# Patient Record
Sex: MALE | Race: WHITE | Employment: FULL TIME | URBAN - METROPOLITAN AREA
[De-identification: names, ages, dates, MRNs, and addresses within clinical notes are randomized per-mention and may not be internally consistent; named-entity substitution may affect disease eponyms.]

---

## 2024-09-18 ENCOUNTER — OFFICE VISIT (OUTPATIENT)
Dept: URGENT CARE | Age: 24
End: 2024-09-18
Payer: COMMERCIAL

## 2024-09-18 VITALS
SYSTOLIC BLOOD PRESSURE: 114 MMHG | DIASTOLIC BLOOD PRESSURE: 74 MMHG | HEIGHT: 69 IN | TEMPERATURE: 98.9 F | WEIGHT: 183.2 LBS | BODY MASS INDEX: 27.13 KG/M2 | RESPIRATION RATE: 16 BRPM | OXYGEN SATURATION: 97 % | HEART RATE: 81 BPM

## 2024-09-18 DIAGNOSIS — J01.10 ACUTE NON-RECURRENT FRONTAL SINUSITIS: Primary | ICD-10-CM

## 2024-09-18 RX ORDER — DOXYCYCLINE 100 MG/1
100 CAPSULE ORAL 2 TIMES DAILY
Qty: 14 CAPSULE | Refills: 0 | Status: SHIPPED | OUTPATIENT
Start: 2024-09-18 | End: 2024-09-19 | Stop reason: SINTOL

## 2024-09-18 RX ORDER — CITALOPRAM 20 MG/1
20 TABLET, FILM COATED ORAL DAILY
COMMUNITY

## 2024-09-18 RX ORDER — FLUTICASONE PROPIONATE 50 MCG
2 SPRAY, SUSPENSION (ML) NASAL DAILY
Qty: 16 G | Refills: 0 | Status: SHIPPED | OUTPATIENT
Start: 2024-09-18 | End: 2024-09-28

## 2024-09-18 ASSESSMENT — ENCOUNTER SYMPTOMS
EYES NEGATIVE: 1
GASTROINTESTINAL NEGATIVE: 1
HEADACHES: 1
SINUS PRESSURE: 1
CONSTITUTIONAL NEGATIVE: 1
ENDOCRINE NEGATIVE: 1
SINUS COMPLAINT: 1
PSYCHIATRIC NEGATIVE: 1
PALPITATIONS: 0
MUSCULOSKELETAL NEGATIVE: 1
RESPIRATORY NEGATIVE: 1
ALLERGIC/IMMUNOLOGIC NEGATIVE: 1
SINUS PAIN: 1
HEMATOLOGIC/LYMPHATIC NEGATIVE: 1

## 2024-09-18 NOTE — LETTER
September 18, 2024     Patient: Bj Kaminski   YOB: 2000   Date of Visit: 9/18/2024       To Whom It May Concern:    It is my medical opinion that Bj Kaminski may return to work on 9/20/24 .    If you have any questions or concerns, please don't hesitate to call.         Sincerely,        GRACIELA Love-CNP    CC: No Recipients

## 2024-09-18 NOTE — PROGRESS NOTES
"Subjective   Patient ID: Bj Kaminski is a 24 y.o. male. They present today with a chief complaint of Sinus Problem (Started 2 days ago /Covid test at home yesterday was negative ), Nasal Congestion, and Facial Pain.    History of Present Illness    Sinus Problem  Associated symptoms: congestion and headaches    Associated symptoms: no chest pain    Facial Pain  Associated symptoms: congestion and headaches    Associated symptoms: no chest pain      Pt presents to urgent care with c/o  sinus pressure, headache, nasal congestion x 3 days.  States he took a home covid test last night which was negative. He is concerned he may have a sinus infection.  Pt denies CP, SOB, palpitations, fevers, abd pain, n/v/d, sick contacts, recent travel.        Past Medical History  Allergies as of 09/18/2024 - Reviewed 09/18/2024   Allergen Reaction Noted    Penicillins Unknown 09/18/2024       (Not in a hospital admission)       No past medical history on file.    No past surgical history on file.         Review of Systems  Review of Systems   Constitutional: Negative.    HENT:  Positive for congestion, sinus pressure and sinus pain.    Eyes: Negative.    Respiratory: Negative.     Cardiovascular:  Negative for chest pain and palpitations.   Gastrointestinal: Negative.    Endocrine: Negative.    Genitourinary: Negative.    Musculoskeletal: Negative.    Skin: Negative.    Allergic/Immunologic: Negative.    Neurological:  Positive for headaches.   Hematological: Negative.    Psychiatric/Behavioral: Negative.     All other systems reviewed and are negative.                                 Objective    Vitals:    09/18/24 1300   BP: 114/74   BP Location: Right arm   Patient Position: Sitting   BP Cuff Size: Adult   Pulse: 81   Resp: 16   Temp: 37.2 °C (98.9 °F)   SpO2: 97%   Weight: 83.1 kg (183 lb 3.2 oz)   Height: 1.74 m (5' 8.5\")     No LMP for male patient.    Physical Exam  Vitals and nursing note reviewed.   Constitutional:       " General: He is not in acute distress.     Appearance: Normal appearance. He is not ill-appearing or toxic-appearing.   HENT:      Head: Atraumatic.      Right Ear: Tympanic membrane, ear canal and external ear normal.      Left Ear: Tympanic membrane, ear canal and external ear normal.      Nose: Nose normal.      Mouth/Throat:      Mouth: Mucous membranes are moist.      Pharynx: Oropharynx is clear. No oropharyngeal exudate or posterior oropharyngeal erythema.   Eyes:      Extraocular Movements: Extraocular movements intact.      Conjunctiva/sclera: Conjunctivae normal.      Pupils: Pupils are equal, round, and reactive to light.   Cardiovascular:      Rate and Rhythm: Normal rate and regular rhythm.      Pulses: Normal pulses.      Heart sounds: Normal heart sounds.   Pulmonary:      Effort: Pulmonary effort is normal. No respiratory distress.      Breath sounds: Normal breath sounds. No stridor. No wheezing, rhonchi or rales.   Chest:      Chest wall: No tenderness.   Abdominal:      General: Abdomen is flat. Bowel sounds are normal.      Palpations: Abdomen is soft.      Tenderness: There is no abdominal tenderness.   Musculoskeletal:         General: Normal range of motion.      Cervical back: Normal range of motion and neck supple. No tenderness.   Skin:     General: Skin is warm and dry.      Capillary Refill: Capillary refill takes less than 2 seconds.   Neurological:      General: No focal deficit present.      Mental Status: He is alert and oriented to person, place, and time.   Psychiatric:         Mood and Affect: Mood normal.         Behavior: Behavior normal.         Thought Content: Thought content normal.         Procedures    Point of Care Test & Imaging Results from this visit  No results found for this visit on 09/18/24.   No results found.    Diagnostic study results (if any) were reviewed by IRWIN Love.    Assessment/Plan   Allergies, medications, history, and pertinent  labs/EKGs/Imaging reviewed by GRACIELA Love-CNP.     Medical Decision Making  Urgent Care Course: Pt presents to the  with rhinorrhea, cough, sinus congestion.  Patient is afebrile, hemodynamically stable.  Patient denies fever, n/v, cp, sob, ab pain, dysuria, and diarrhea.  Patient states that they normally get sinusitis this time of year and requires antibiotics.  Low suspicion for pneumonia/bronchitis given that patient's lungs are clear to auscultation bilaterally.  Given longevity of symptoms, will treat patient for sinusitis with doxy (given PCN allergy), flonase.  Patient given sinusitis and pneumonia warning signs, prescription for doxy, flonase and will f/u with pcp.    Prior external records reviewed: Outpatient records  Independent Hx provided by: Patient  Med Rx considered but ultimately not given: Steroids   Dx tests considered but ultimately not ordered: Covid, Influenza  Social determinant that may affects healthcare: Pharmacy closed  Pt's case/impression summarized and discussed with: Patient  Likely Dx given clinical picture: Sinusitis   Although not an exhaustive list of Differential Diagnosis (though considered), patient's HPI, PE, and other findings are not suggestive of: URI, bronchitis, pneumonia, Covid-19, influenza, asthma exacerbation   Patient at time of discharge was clinically well-appearing and HDS for outpatient management. The patient and/or family was given the opportunity to ask questions prior to discharge, understood my verbal discussion of the plans for treatment, expected course, indications to return to ED, and the need for timely follow up as directed.    Condition: Stable     Orders and Diagnoses  Diagnoses and all orders for this visit:  Acute non-recurrent frontal sinusitis  -     doxycycline (Monodox) 100 mg capsule; Take 1 capsule (100 mg) by mouth 2 times a day for 7 days. Take with at least 8 ounces (large glass) of water, do not lie down for 30 minutes  after  -     fluticasone (Flonase) 50 mcg/actuation nasal spray; Administer 2 sprays into each nostril once daily for 10 days. Shake gently. Before first use, prime pump. After use, clean tip and replace cap.      Medical Admin Record      Patient disposition: Home    Electronically signed by IRWIN Love  2:15 PM

## 2024-09-19 DIAGNOSIS — J01.10 ACUTE NON-RECURRENT FRONTAL SINUSITIS: Primary | ICD-10-CM

## 2024-09-19 RX ORDER — LEVOFLOXACIN 750 MG/1
750 TABLET ORAL DAILY
Qty: 7 TABLET | Refills: 0 | Status: SHIPPED | OUTPATIENT
Start: 2024-09-19 | End: 2024-09-26